# Patient Record
(demographics unavailable — no encounter records)

---

## 2025-07-15 NOTE — ASSESSMENT
[FreeTextEntry1] : 20M w no PMH presents for ED. There is likely a strong psychogenic component. We had a long discussion about the nature of this condition and management options. We agreed to increase tadalafil dose and focus on lifestyle modification.  -OSH labs -diet, exercise, stress management -tadalafil 20mg PRN -TTM 8/6/25